# Patient Record
(demographics unavailable — no encounter records)

---

## 2024-10-30 NOTE — DISCUSSION/SUMMARY
[FreeTextEntry1] : #POP: -Patient demonstrated how to she removes the pessary to ensure proper technique -Cube #6 left out for pelvic rest; she plans to reinsert it on her own in 1-2 weeks.  Patient requesting a new psc given the discoloration of her old one.  -Strongly suggested she start using Replens 2-3 times a week at bedtime; continue using lubricant with pessary care -Continue to perform self-care every week -She is scheduled to RTO on 2/3/25 to discuss her surgical options.  She knows the psc needs to be left out for 10-14 days prior to this appointment,  All questions answered to the patient's satisfaction.  She expressed understanding. She knows to contact the office with any questions or concerns.

## 2024-10-30 NOTE — HISTORY OF PRESENT ILLNESS
[FreeTextEntry1] : Roseann is a 48-year-old with hx of POP and some BARNEY symptoms who currently has a cube #6 in place.  Overall, she is happy with the support provided by the pessary.  She performs self-care weekly; she sometimes struggles with taking it in and out noting blood on the pessary with removal. The pessary in sometimes uncomfortable after insertion requiring repositioning.  She leaves the pessary out during menstruation.  She is voiding and moving bowels without issues.  She does not use any vaginal moisturizers; she does use lubricant for psc removal and insertion.  previous pessaries tried: RS #4, RS #5, RS #6, Cube #3, Cube #4, Cube #5

## 2024-10-30 NOTE — PHYSICAL EXAM
[Chaperone Present] : A chaperone was present in the examining room during all aspects of the physical examination [73448] : A chaperone was present during the pelvic exam. [No Acute Distress] : in no acute distress [Well developed] : well developed [Well Nourished] : ~L well nourished [Good Hygeine] : demonstrates good hygeine [Oriented x3] : oriented to person, place, and time [Normal Memory] : ~T memory was ~L unimpaired [Normal Mood/Affect] : mood and affect are normal [Warm and Dry] : was warm and dry to touch [Normal Gait] : gait was normal [Labia Majora] : were normal [Labia Minora] : were normal [Normal] : was normal [Discharge] : a  ~M vaginal discharge was present [White] : white [Thin] : thin [Scant] : there was scant vaginal bleeding [FreeTextEntry2] : CASEY Iglesias [Anxiety] : patient is not anxious [FreeTextEntry4] : superficial lacerations from pessary bilateral vaginal walls

## 2024-10-30 NOTE — PROCEDURE
[Good Fit] : fits well [Erythema] : erythema noted [Discharge] : there is vaginal discharge [Pessary Out] : removed [Mild] : mild bleeding [Resolved w/pressure] : was resolved by applying pressure [Medication Review] : Medicaiton Review: Patient verbalizes understanding of risks and benefits [Refit] : refit is not needed [Erosion] : no evidence of erosion [Infection] : no evidence of infection [FreeTextEntry1] : Cube #6  [de-identified] : with superficial lacerations bilateral vaginal walls [FreeTextEntry8] : Routine perineal care reviewed

## 2025-02-03 NOTE — PHYSICAL EXAM
[Chaperone Present] : A chaperone was present in the examining room during all aspects of the physical examination [33048] : A chaperone was present during the pelvic exam. [Normal] : normal external genitalia [Normal Appearance] : general appearance was normal [FreeTextEntry4] : No evidence of laceration [de-identified] : Unable to assess due to recent pessary

## 2025-02-03 NOTE — DISCUSSION/SUMMARY
[FreeTextEntry1] : I reviewed the above findings with the patient with visual illustrations. Treatment options for the prolapse were discussed and included doing nothing, Kegel exercises and behavioral modification, a pessary, or surgical correction.  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  We discussed removing the cube pessary nightly to see if this decreases irritation and her bleeding.  We also discussed keeping the cube pessary out when she has her period.  All questions were answered.  We discussed following up with her GYN and following up here in approximately 6 months or earlier as necessary

## 2025-02-03 NOTE — HISTORY OF PRESENT ILLNESS
[FreeTextEntry1] : Patient returns today for follow-up on her pelvic organ prolapse.  She has been using a cube pessary however does get some irritation erythema from the pessary which causes some bleeding this is a problem for her after she gets her.  Sometimes as she follows [place].  She does leave the pessary in for a week at times she does self care.  She reports that when she does take the pessary out she does see some lacerations on the vagina she is here to discuss alternative treatment options.  She did inquire about continuous OCP to not get it.  We discussed following up with her GYN for such.

## 2025-05-12 NOTE — PROCEDURE
[Endometrial Biopsy] : Endometrial biopsy [Infection] : infection [Bleeding] : bleeding [Allergic Reaction] : allergic reaction [Uterine Perforation] : uterine perforation [Pain] : pain [Negative] : negative pregnancy test [Betadine] : Betadine [None] : none [Tenaculum] : Tenaculum [Easy Passage] : Easy passage [Sounded to ___ cm] : sounded to [unfilled] ~Ucm [Anteverted] : anteverted [Moderate] : moderate [Specimen Collected] : collected [IUD Removal] : intrauterine device (IUD) removal [Time out performed] : Pre-procedure time out performed.  Patient's name, date of birth and procedure confirmed. [Consent Obtained] : Consent obtained [Risks] : risks [Benefits] : benefits [Alternatives] : alternatives [Patient] : patient [Strings Visualized] : strings visualized [Sent to Pathology] : specimen was placed in buffered formalin and sent for pathology [Tolerated Well] : Patient tolerated the procedure well [No Complications] : no complications [Heavy Vaginal Bleeding] : for heavy vaginal bleeding [Pelvic Pain] : for pelvic pain [de-identified] : pt has been bleeding continuously since end of February 2025 [de-identified] : 3 mm pipelle

## 2025-05-12 NOTE — PLAN
[FreeTextEntry1] : Pt having constant uterine bleeding, tried Slynd, is conitnuing to bleed advised removal of the IUD and endometrial sampling both these procedures were done today pt tolerated both well will start on Aygestin BID folllow up path ret 6 wks, to reeval pt has sig POP wears a cube pessary intermittantly, it causes vaginal erosions

## 2025-06-24 NOTE — PLAN
[FreeTextEntry1] : pt advised to switch to Slynd, she had tried previously but that was when she had the IUD still in place to switch directly to the Slynd, advised she may or may not break through with the switch,   she does not have to do a pill break right now because she is traveling soon and would prefer not to have a period now can continue with the aygestin for another 2 wks and switch then ret PRN

## 2025-06-24 NOTE — HISTORY OF PRESENT ILLNESS
[FreeTextEntry1] : pt currently on aygestin BID for the past 6 wks pt started this medicine after removing the Paragard IUD pt has had no bleeding on this medication but she has a hard time remembering to take it BID